# Patient Record
Sex: FEMALE | Race: WHITE | NOT HISPANIC OR LATINO | Employment: FULL TIME | URBAN - METROPOLITAN AREA
[De-identification: names, ages, dates, MRNs, and addresses within clinical notes are randomized per-mention and may not be internally consistent; named-entity substitution may affect disease eponyms.]

---

## 2019-01-16 ENCOUNTER — TRANSFERRED RECORDS (OUTPATIENT)
Dept: HEALTH INFORMATION MANAGEMENT | Facility: CLINIC | Age: 49
End: 2019-01-16

## 2019-01-22 ENCOUNTER — HOSPITAL ENCOUNTER (OUTPATIENT)
Dept: NUCLEAR MEDICINE | Facility: HOSPITAL | Age: 49
Setting detail: NUCLEAR MEDICINE
End: 2019-01-22
Attending: FAMILY MEDICINE
Payer: COMMERCIAL

## 2019-01-22 ENCOUNTER — HOSPITAL ENCOUNTER (OUTPATIENT)
Dept: CARDIOLOGY | Facility: HOSPITAL | Age: 49
Setting detail: NUCLEAR MEDICINE
End: 2019-01-22
Attending: FAMILY MEDICINE
Payer: COMMERCIAL

## 2019-01-22 ENCOUNTER — HOSPITAL ENCOUNTER (OUTPATIENT)
Dept: NUCLEAR MEDICINE | Facility: HOSPITAL | Age: 49
Setting detail: NUCLEAR MEDICINE
Discharge: HOME | End: 2019-01-22
Attending: FAMILY MEDICINE | Admitting: FAMILY MEDICINE
Payer: COMMERCIAL

## 2019-01-22 DIAGNOSIS — R94.31 ABNORMAL EKG: ICD-10-CM

## 2019-01-22 PROCEDURE — A9500 TC99M SESTAMIBI: HCPCS | Performed by: RADIOLOGY

## 2019-01-22 PROCEDURE — 93017 CV STRESS TEST TRACING ONLY: CPT

## 2019-01-22 PROCEDURE — 78452 HT MUSCLE IMAGE SPECT MULT: CPT | Mod: TC

## 2019-01-22 PROCEDURE — 34300033 ZZH RX 343: Performed by: RADIOLOGY

## 2019-01-22 PROCEDURE — 93018 CV STRESS TEST I&R ONLY: CPT | Performed by: INTERNAL MEDICINE

## 2019-01-22 PROCEDURE — 93016 CV STRESS TEST SUPVJ ONLY: CPT | Performed by: INTERNAL MEDICINE

## 2019-01-22 RX ADMIN — Medication 30 MILLICURIE: at 08:44

## 2019-01-22 RX ADMIN — Medication 10 MILLICURIE: at 06:57

## 2023-08-03 ENCOUNTER — TRANSFERRED RECORDS (OUTPATIENT)
Dept: HEALTH INFORMATION MANAGEMENT | Facility: CLINIC | Age: 53
End: 2023-08-03

## 2023-10-13 PROBLEM — F41.9 ANXIETY: Status: ACTIVE | Noted: 2023-10-13

## 2023-10-13 PROBLEM — E78.5 DYSLIPIDEMIA: Status: ACTIVE | Noted: 2023-10-13

## 2023-10-17 ENCOUNTER — TELEPHONE (OUTPATIENT)
Dept: OTOLARYNGOLOGY | Facility: OTHER | Age: 53
End: 2023-10-17

## 2023-10-17 NOTE — TELEPHONE ENCOUNTER
Called patient to clarify why she is coming to ENT on 10/19/23. Patient reports she has had recurring tonsil stones, and sleep apnea, snoring, frequently wakes up at night. Pt reports she is unable to where a CPAP mask, as she sleeps on her stomach.     Patient missed her last treatment due being covid positive at the time, but has every intention of coming to this appointment.

## 2023-10-17 NOTE — TELEPHONE ENCOUNTER
----- Message from Yulissa Inman NP sent at 10/17/2023 12:54 PM CDT -----  Regarding: RE: questions  She was never seen by me.  She was scheduled to see me for surgical consult for sleep apnea, she then was rescheduled to see you, then no showed.  [facility]  ----- Message -----  From: Barbara Akins MD  Sent: 10/17/2023  12:24 PM CDT  To: Tano Roblero RN; Yulissa Inman NP  Subject: questions                                        I don't see any notes from [facility]?  States surgical consult from [facility]    Please get details and notes thanks

## 2023-10-18 NOTE — PROGRESS NOTES
Otolaryngology Consultation    Patient: Seelne Frias  : 1970    Patient presents with:  Consult: Surgical consult - per PATRICIA/[place]/ (HX; sleep apnea, recurring tonsils, snoring)  Called patient to clarify why she is coming to ENT on 10/19/23. Patient reports she has had recurring tonsil stones, and sleep apnea, snoring, frequently wakes up at night. Pt reports she is unable to where a CPAP mask, as she sleeps on her stomach.          HPI:  Selene Frias is a 52 year old female seen today for   Tonsillar hypertrophy and ALEXX   She feels her airway is collapsing at the onset of sleep  She has chronic globus    Frequent awakening at bedtime  Chronic daytime somnolence  Partner notes recurrent apneic episodes    No chronic tonsillitis    PSG dated 2019 at [facility]  Severe obstructive sleep apnea   AHI 59, 02 yaneli 82%  About 15 pound weight gain from her 2019 sleep study    Sleeps prone  CPAP intolerance    Everyday tobacco user 7.5-pack-year history        Current Outpatient Rx   Medication Sig Dispense Refill    amLODIPine (NORVASC) 10 MG tablet Take 10 mg by mouth daily      estradiol (ESTRACE) 0.5 MG tablet Take 0.5 mg by mouth daily      FLUoxetine HCl, PMDD, 20 MG TABS Take 20 mg by mouth daily      losartan-hydrochlorothiazide (HYZAAR) 100-12.5 MG tablet Take 0.5 tablets by mouth daily      rosuvastatin (CRESTOR) 5 MG tablet Take 5 mg by mouth at bedtime         Allergies: Bupropion and Sulfa antibiotics     No past medical history on file.    No past surgical history on file.    ENT family history reviewed    Social History     Tobacco Use    Smoking status: Every Day     Packs/day: 0.50     Years: 35.00     Additional pack years: 0.00     Total pack years: 17.50     Types: Cigarettes    Smokeless tobacco: Never   Vaping Use    Vaping Use: Never used       Review of Systems  ROS: 10 point ROS neg other than the symptoms noted above in the HPI     Physical Exam  /70 (BP  "Location: Right arm, Patient Position: Right side, Cuff Size: Adult Regular)   Pulse 76   Temp 98.7  F (37.1  C) (Tympanic)   Ht 1.575 m (5' 2\")   Wt 61.2 kg (135 lb)   SpO2 99%   BMI 24.69 kg/m    General - The patient is well nourished and well developed, and appears to have good nutritional status.  Alert and oriented to person and place, answers questions and cooperates with examination appropriately.   Head and Face - Normocephalic and atraumatic, with no gross asymmetry noted.  The facial nerve is intact, with strong symmetric movements.  Voice and Breathing - The patient was breathing comfortably without the use of accessory muscles. There was no wheezing, stridor, or stertor.  The patients voice was clear and strong, and had appropriate pitch and quality.  No francis peripheral digital clubbing or cyanosis   Ears -The external auditory canals are patent, the tympanic membranes are intact without effusion, retraction or mass.  Bony landmarks are intact.  Eyes - Extraocular movements intact, and the pupils were reactive to light.  Sclera were not icteric or injected, conjunctiva were pink and moist.  Mouth - Examination of the oral cavity showed pink, healthy oral mucosa. No lesions or ulcerations noted.  The tongue was mobile and midline, and the dentition were in good condition.    Throat - The walls of the oropharynx were smooth, pink, moist, symmetric, and had no lesions or ulcerations.  The tonsillar pillars and soft palate were symmetric.  The uvula was midline on elevation.  Mallory Palate Position IIa  Grade 3 tonsils, no mass, thick elongated uvula  Neck - No palpable enlarged fixed cervical lymph nodes.  No neck cysts or unusual tenderness to palpation.   No palpable fixed thyroid nodules or concerning goiter.  The trachea is grossly midline.   Nose - External contour is symmetric, no gross deflection or scars.  Nasal mucosa is pink and moist with no abnormal mucus.  The septum and turbinates " were evaluated.  No polyps, masses, or purulence noted on examination.    Attempts at mirror laryngoscopy were not possible due to gag reflex.  Therefore I proceeded with a fiberoptic examination after informed consent.  First I applied topical nasal lidocaine and neosynephrine.  I then passed the scope through the nasal cavity.     The nasopharynx was mucosally covered and symmetric.  The eustachian tube openings were unobstructed.  Going further down I had a clear view of the base of tongue which had normal appearing grade 4 lingual tonsillar tissue.  The base of tongue was free of lesions, and the vallecula was open.  The epiglottis was smooth and mucosally covered.  The supraglottic larynx was then clearly visualized.  The vocal cords moved smoothly and symmetrically and were without mass or nodules.  Vocal cord mobility was normal bilaterally with phonation and respiration..  The pyriform sinuses were open and without francis mass or pooling of secretions upon valsalva, and the limited view of the postcricoid region did not show any lesions.  The patient tolerated the procedure well.      Impression and Plan- Selene Frias is a 52 year old female with:    ICD-10-CM    1. Lingual tonsil hypertrophy  J35.1       2. Tonsillar hypertrophy  J35.1       3. 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 ALEXX (obstructive sleep apnea)  G47.33       5. Tobacco abuse counseling  Z71.6         Recommend consult with Dr. Nagel for [facility]  Procedure briefly discussed    May require repeat PSG    Anatomy discussed    Other surgical options were discussed including palatine and lingual tonsillectomy with risks of post op hemorrhage and significant post operative pain.    Tobacco cessation was strongly encouraged.  The associated risk of head and neck cancer was discussed.  Every year of cessation offers health benefits. This was discussed with the patient today and they voiced understanding.  Quit tools and a nicotine patch  were offered.        Risks of untreated sleep apnea are well documented, including but not limited to, the inability to reach restorative sleep leading to daytime somnolence, fatigue, irritability and poor work performance, risk of motor vehicle accidents, depression, memory issues, waking headaches, impotence, and increased nocturia.  More serious risks include concerns with medication/narcotic use and surgery related to the use of anesthesia, coronary artery disease, heart failure, heart attack, stroke and sudden death.    Achieving a healthy weight, adhering to a healthy diet, and exercise are very important in the treatment of sleep apnea and were discussed and encouraged.    Clinical evidence shows CPAP to be the treatment of choice for obstructive sleep apnea. However, if CPAP is not tolerated after reasonable attempts at treatment, surgical intervention may be necessary.       Barbara Akins D.O.  Otolaryngology/Head and Neck Surgery  Allergy

## 2023-10-19 ENCOUNTER — OFFICE VISIT (OUTPATIENT)
Dept: OTOLARYNGOLOGY | Facility: OTHER | Age: 53
End: 2023-10-19
Attending: OTOLARYNGOLOGY
Payer: COMMERCIAL

## 2023-10-19 VITALS
DIASTOLIC BLOOD PRESSURE: 70 MMHG | HEIGHT: 62 IN | WEIGHT: 135 LBS | TEMPERATURE: 98.7 F | OXYGEN SATURATION: 99 % | BODY MASS INDEX: 24.84 KG/M2 | HEART RATE: 76 BPM | SYSTOLIC BLOOD PRESSURE: 138 MMHG

## 2023-10-19 DIAGNOSIS — K13.79 HYPERTROPHIC SOFT PALATE: ICD-10-CM

## 2023-10-19 DIAGNOSIS — Z71.6 TOBACCO ABUSE COUNSELING: ICD-10-CM

## 2023-10-19 DIAGNOSIS — J35.1 LINGUAL TONSIL HYPERTROPHY: Primary | ICD-10-CM

## 2023-10-19 DIAGNOSIS — G47.33 OSA (OBSTRUCTIVE SLEEP APNEA): ICD-10-CM

## 2023-10-19 DIAGNOSIS — J35.1 TONSILLAR HYPERTROPHY: ICD-10-CM

## 2023-10-19 PROCEDURE — 31575 DIAGNOSTIC LARYNGOSCOPY: CPT | Performed by: OTOLARYNGOLOGY

## 2023-10-19 PROCEDURE — 99203 OFFICE O/P NEW LOW 30 MIN: CPT | Mod: 25 | Performed by: OTOLARYNGOLOGY

## 2023-10-19 RX ORDER — FLUOXETINE 20 MG/1
20 TABLET ORAL DAILY
COMMUNITY

## 2023-10-19 RX ORDER — ESTRADIOL 0.5 MG/1
0.5 TABLET ORAL DAILY
COMMUNITY

## 2023-10-19 RX ORDER — AMLODIPINE BESYLATE 10 MG/1
10 TABLET ORAL DAILY
COMMUNITY
Start: 2022-03-23

## 2023-10-19 RX ORDER — LOSARTAN POTASSIUM AND HYDROCHLOROTHIAZIDE 12.5; 1 MG/1; MG/1
0.5 TABLET ORAL DAILY
COMMUNITY

## 2023-10-19 RX ORDER — ROSUVASTATIN CALCIUM 5 MG/1
5 TABLET, COATED ORAL AT BEDTIME
COMMUNITY

## 2023-10-19 ASSESSMENT — PAIN SCALES - GENERAL: PAINLEVEL: NO PAIN (0)

## 2023-10-19 NOTE — LETTER
10/19/2023         RE: Selene Frias  [address]  [place] [address]        Dear Colleague,    Thank you for referring your patient, Selene Frias, to the [facility]. Please see a copy of my visit note below.      Otolaryngology Consultation    Patient: Selene Frias  : 1970    Patient presents with:  Consult: Surgical consult - per PATRICIA/[place] [facility]/ (HX; sleep apnea, recurring tonsils, snoring)  Called patient to clarify why she is coming to ENT on 10/19/23. Patient reports she has had recurring tonsil stones, and sleep apnea, snoring, frequently wakes up at night. Pt reports she is unable to where a CPAP mask, as she sleeps on her stomach.          HPI:  Selene Frias is a 52 year old female seen today for   Tonsillar hypertrophy and ALEXX   She feels her airway is collapsing at the onset of sleep  She has chronic globus    Frequent awakening at bedtime  Chronic daytime somnolence  Partner notes recurrent apneic episodes    No chronic tonsillitis    PSG dated 2019 at [place] [facility]  Severe obstructive sleep apnea   AHI 59, 02 yaneli 82%  About 15 pound weight gain from her 2019 sleep study    Sleeps prone  CPAP intolerance    Everyday tobacco user 7.5-pack-year history        Current Outpatient Rx   Medication Sig Dispense Refill     amLODIPine (NORVASC) 10 MG tablet Take 10 mg by mouth daily       estradiol (ESTRACE) 0.5 MG tablet Take 0.5 mg by mouth daily       FLUoxetine HCl, PMDD, 20 MG TABS Take 20 mg by mouth daily       losartan-hydrochlorothiazide (HYZAAR) 100-12.5 MG tablet Take 0.5 tablets by mouth daily       rosuvastatin (CRESTOR) 5 MG tablet Take 5 mg by mouth at bedtime         Allergies: Bupropion and Sulfa antibiotics     No past medical history on file.    No past surgical history on file.    ENT family history reviewed    Social History     Tobacco Use     Smoking status: Every Day     Packs/day: 0.50     Years: 35.00     Additional pack years: 0.00      "Total pack years: 17.50     Types: Cigarettes     Smokeless tobacco: Never   Vaping Use     Vaping Use: Never used       Review of Systems  ROS: 10 point ROS neg other than the symptoms noted above in the HPI     Physical Exam  /70 (BP Location: Right arm, Patient Position: Right side, Cuff Size: Adult Regular)   Pulse 76   Temp 98.7  F (37.1  C) (Tympanic)   Ht 1.575 m (5' 2\")   Wt 61.2 kg (135 lb)   SpO2 99%   BMI 24.69 kg/m    General - The patient is well nourished and well developed, and appears to have good nutritional status.  Alert and oriented to person and place, answers questions and cooperates with examination appropriately.   Head and Face - Normocephalic and atraumatic, with no gross asymmetry noted.  The facial nerve is intact, with strong symmetric movements.  Voice and Breathing - The patient was breathing comfortably without the use of accessory muscles. There was no wheezing, stridor, or stertor.  The patients voice was clear and strong, and had appropriate pitch and quality.  No francis peripheral digital clubbing or cyanosis   Ears -The external auditory canals are patent, the tympanic membranes are intact without effusion, retraction or mass.  Bony landmarks are intact.  Eyes - Extraocular movements intact, and the pupils were reactive to light.  Sclera were not icteric or injected, conjunctiva were pink and moist.  Mouth - Examination of the oral cavity showed pink, healthy oral mucosa. No lesions or ulcerations noted.  The tongue was mobile and midline, and the dentition were in good condition.    Throat - The walls of the oropharynx were smooth, pink, moist, symmetric, and had no lesions or ulcerations.  The tonsillar pillars and soft palate were symmetric.  The uvula was midline on elevation.  Mallory Palate Position IIa  Grade 3 tonsils, no mass, thick elongated uvula  Neck - No palpable enlarged fixed cervical lymph nodes.  No neck cysts or unusual tenderness to palpation.   No " palpable fixed thyroid nodules or concerning goiter.  The trachea is grossly midline.   Nose - External contour is symmetric, no gross deflection or scars.  Nasal mucosa is pink and moist with no abnormal mucus.  The septum and turbinates were evaluated.  No polyps, masses, or purulence noted on examination.    Attempts at mirror laryngoscopy were not possible due to gag reflex.  Therefore I proceeded with a fiberoptic examination after informed consent.  First I applied topical nasal lidocaine and neosynephrine.  I then passed the scope through the nasal cavity.     The nasopharynx was mucosally covered and symmetric.  The eustachian tube openings were unobstructed.  Going further down I had a clear view of the base of tongue which had normal appearing grade 4 lingual tonsillar tissue.  The base of tongue was free of lesions, and the vallecula was open.  The epiglottis was smooth and mucosally covered.  The supraglottic larynx was then clearly visualized.  The vocal cords moved smoothly and symmetrically and were without mass or nodules.  Vocal cord mobility was normal bilaterally with phonation and respiration..  The pyriform sinuses were open and without francis mass or pooling of secretions upon valsalva, and the limited view of the postcricoid region did not show any lesions.  The patient tolerated the procedure well.      Impression and Plan- Selene Frias is a 52 year old female with:    ICD-10-CM    1. Lingual tonsil hypertrophy  J35.1       2. Tonsillar hypertrophy  J35.1       3. 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 ALEXX (obstructive sleep apnea)  G47.33       5. Tobacco abuse counseling  Z71.6         Recommend consult with Dr. Nagel for [facility]  Procedure briefly discussed    May require repeat PSG    Anatomy discussed    Other surgical options were discussed including palatine and lingual tonsillectomy with risks of post op hemorrhage and significant post operative pain.    Tobacco cessation was  strongly encouraged.  The associated risk of head and neck cancer was discussed.  Every year of cessation offers health benefits. This was discussed with the patient today and they voiced understanding.  Quit tools and a nicotine patch were offered.        Risks of untreated sleep apnea are well documented, including but not limited to, the inability to reach restorative sleep leading to daytime somnolence, fatigue, irritability and poor work performance, risk of motor vehicle accidents, depression, memory issues, waking headaches, impotence, and increased nocturia.  More serious risks include concerns with medication/narcotic use and surgery related to the use of anesthesia, coronary artery disease, heart failure, heart attack, stroke and sudden death.    Achieving a healthy weight, adhering to a healthy diet, and exercise are very important in the treatment of sleep apnea and were discussed and encouraged.    Clinical evidence shows CPAP to be the treatment of choice for obstructive sleep apnea. However, if CPAP is not tolerated after reasonable attempts at treatment, surgical intervention may be necessary.       Barbara Akins D.O.  Otolaryngology/Head and Neck Surgery  Allergy      Again, thank you for allowing me to participate in the care of your patient.        Sincerely,        Barbara Akins MD

## 2024-10-02 ENCOUNTER — TRANSFERRED RECORDS (OUTPATIENT)
Dept: HEALTH INFORMATION MANAGEMENT | Facility: CLINIC | Age: 54
End: 2024-10-02

## 2024-10-30 NOTE — PATIENT INSTRUCTIONS
Order has been placed for referral for an appointment at [place], [place] with, Dr. Triplett, for [facility].     Thank you for allowing Dr. Akins and our ENT team to participate in your care.  If your medications are too expensive, please give the nurse a call.  We can possibly change this medication.  If you have a scheduling or an appointment question please contact our Health Unit Coordinator at their direct line [phone].   ALL nursing questions or concerns can be directed to your ENT nurse, Tano, at: [phone]     66